# Patient Record
Sex: MALE | Race: BLACK OR AFRICAN AMERICAN
[De-identification: names, ages, dates, MRNs, and addresses within clinical notes are randomized per-mention and may not be internally consistent; named-entity substitution may affect disease eponyms.]

---

## 2019-08-19 ENCOUNTER — HOSPITAL ENCOUNTER (EMERGENCY)
Dept: HOSPITAL 92 - ERS | Age: 69
Discharge: HOME | End: 2019-08-19
Payer: OTHER GOVERNMENT
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
## 2019-08-19 DIAGNOSIS — M54.2: ICD-10-CM

## 2019-08-19 DIAGNOSIS — R10.9: ICD-10-CM

## 2019-08-19 DIAGNOSIS — R07.81: Primary | ICD-10-CM

## 2019-08-19 DIAGNOSIS — N18.3: ICD-10-CM

## 2019-08-19 DIAGNOSIS — I12.9: ICD-10-CM

## 2019-08-19 DIAGNOSIS — R55: ICD-10-CM

## 2019-08-19 LAB
ALBUMIN SERPL BCG-MCNC: 4.2 G/DL (ref 3.4–4.8)
ALP SERPL-CCNC: 63 U/L (ref 40–150)
ALT SERPL W P-5'-P-CCNC: 36 U/L (ref 8–55)
ANION GAP SERPL CALC-SCNC: 16 MMOL/L (ref 10–20)
AST SERPL-CCNC: 40 U/L (ref 5–34)
BASOPHILS # BLD AUTO: 0 THOU/UL (ref 0–0.2)
BASOPHILS NFR BLD AUTO: 0.4 % (ref 0–1)
BILIRUB SERPL-MCNC: 0.2 MG/DL (ref 0.2–1.2)
BUN SERPL-MCNC: 19 MG/DL (ref 8.4–25.7)
CALCIUM SERPL-MCNC: 9.4 MG/DL (ref 7.8–10.44)
CHLORIDE SERPL-SCNC: 103 MMOL/L (ref 98–107)
CO2 SERPL-SCNC: 19 MMOL/L (ref 23–31)
CREAT CL PREDICTED SERPL C-G-VRATE: 0 ML/MIN (ref 70–130)
DRUG SCREEN CUTOFF: (no result)
EOSINOPHIL # BLD AUTO: 0.4 THOU/UL (ref 0–0.7)
EOSINOPHIL NFR BLD AUTO: 6.5 % (ref 0–10)
GLOBULIN SER CALC-MCNC: 3 G/DL (ref 2.4–3.5)
GLUCOSE SERPL-MCNC: 99 MG/DL (ref 80–115)
HGB BLD-MCNC: 13.1 G/DL (ref 14–18)
LYMPHOCYTES # BLD: 2.1 THOU/UL (ref 1.2–3.4)
LYMPHOCYTES NFR BLD AUTO: 36.7 % (ref 21–51)
MCH RBC QN AUTO: 29.9 PG (ref 27–31)
MCV RBC AUTO: 88.6 FL (ref 78–98)
MEDTOX CONTROL LINE VALID?: (no result)
MEDTOX READER #: (no result)
MONOCYTES # BLD AUTO: 0.6 THOU/UL (ref 0.11–0.59)
MONOCYTES NFR BLD AUTO: 10.8 % (ref 0–10)
NEUTROPHILS # BLD AUTO: 2.7 THOU/UL (ref 1.4–6.5)
NEUTROPHILS NFR BLD AUTO: 45.6 % (ref 42–75)
PLATELET # BLD AUTO: 172 THOU/UL (ref 130–400)
POTASSIUM SERPL-SCNC: 4.3 MMOL/L (ref 3.5–5.1)
RBC # BLD AUTO: 4.39 MILL/UL (ref 4.7–6.1)
RBC UR QL AUTO: (no result) HPF (ref 0–3)
SODIUM SERPL-SCNC: 134 MMOL/L (ref 136–145)
WBC # BLD AUTO: 5.8 THOU/UL (ref 4.8–10.8)
WBC UR QL AUTO: (no result) HPF (ref 0–3)

## 2019-08-19 PROCEDURE — 80053 COMPREHEN METABOLIC PANEL: CPT

## 2019-08-19 PROCEDURE — 70450 CT HEAD/BRAIN W/O DYE: CPT

## 2019-08-19 PROCEDURE — 93005 ELECTROCARDIOGRAM TRACING: CPT

## 2019-08-19 PROCEDURE — 84484 ASSAY OF TROPONIN QUANT: CPT

## 2019-08-19 PROCEDURE — 84146 ASSAY OF PROLACTIN: CPT

## 2019-08-19 PROCEDURE — 36415 COLL VENOUS BLD VENIPUNCTURE: CPT

## 2019-08-19 PROCEDURE — 83690 ASSAY OF LIPASE: CPT

## 2019-08-19 PROCEDURE — 85025 COMPLETE CBC W/AUTO DIFF WBC: CPT

## 2019-08-19 PROCEDURE — 83605 ASSAY OF LACTIC ACID: CPT

## 2019-08-19 PROCEDURE — 71045 X-RAY EXAM CHEST 1 VIEW: CPT

## 2019-08-19 PROCEDURE — 80306 DRUG TEST PRSMV INSTRMNT: CPT

## 2019-08-19 PROCEDURE — 74177 CT ABD & PELVIS W/CONTRAST: CPT

## 2019-08-19 PROCEDURE — 81015 MICROSCOPIC EXAM OF URINE: CPT

## 2019-08-19 PROCEDURE — 72125 CT NECK SPINE W/O DYE: CPT

## 2019-08-19 PROCEDURE — 81003 URINALYSIS AUTO W/O SCOPE: CPT

## 2019-08-19 PROCEDURE — 71260 CT THORAX DX C+: CPT
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
## 2020-05-18 ENCOUNTER — HOSPITAL ENCOUNTER (EMERGENCY)
Dept: HOSPITAL 92 - ERS | Age: 70
Discharge: HOME | End: 2020-05-18
Payer: OTHER GOVERNMENT

## 2020-05-18 DIAGNOSIS — I10: ICD-10-CM

## 2020-05-18 DIAGNOSIS — E86.0: ICD-10-CM

## 2020-05-18 DIAGNOSIS — T67.5XXA: Primary | ICD-10-CM
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
## 2020-05-18 LAB
ALBUMIN SERPL BCG-MCNC: 3.7 G/DL (ref 3.4–4.8)
ALP SERPL-CCNC: 64 U/L (ref 40–110)
ALT SERPL W P-5'-P-CCNC: 24 U/L (ref 8–55)
ANION GAP SERPL CALC-SCNC: 14 MMOL/L (ref 10–20)
AST SERPL-CCNC: 29 U/L (ref 5–34)
BASOPHILS # BLD AUTO: 0 THOU/UL (ref 0–0.2)
BASOPHILS NFR BLD AUTO: 0.4 % (ref 0–1)
BILIRUB SERPL-MCNC: 0.3 MG/DL (ref 0.2–1.2)
BUN SERPL-MCNC: 22 MG/DL (ref 8.4–25.7)
CALCIUM SERPL-MCNC: 8.6 MG/DL (ref 7.8–10.44)
CHLORIDE SERPL-SCNC: 109 MMOL/L (ref 98–107)
CK SERPL-CCNC: 779 U/L (ref 30–200)
CO2 SERPL-SCNC: 18 MMOL/L (ref 23–31)
CREAT CL PREDICTED SERPL C-G-VRATE: 0 ML/MIN (ref 70–130)
DRUG SCREEN CUTOFF: (no result)
EOSINOPHIL # BLD AUTO: 0.3 THOU/UL (ref 0–0.7)
EOSINOPHIL NFR BLD AUTO: 3.7 % (ref 0–10)
GLOBULIN SER CALC-MCNC: 3.2 G/DL (ref 2.4–3.5)
GLUCOSE SERPL-MCNC: 114 MG/DL (ref 80–115)
HGB BLD-MCNC: 12.3 G/DL (ref 14–18)
LYMPHOCYTES # BLD: 1.4 THOU/UL (ref 1.2–3.4)
LYMPHOCYTES NFR BLD AUTO: 21.2 % (ref 21–51)
MCH RBC QN AUTO: 29.6 PG (ref 27–31)
MCV RBC AUTO: 90.5 FL (ref 78–98)
MEDTOX CONTROL LINE VALID?: (no result)
MEDTOX READER #: (no result)
MONOCYTES # BLD AUTO: 0.8 THOU/UL (ref 0.11–0.59)
MONOCYTES NFR BLD AUTO: 11.2 % (ref 0–10)
NEUTROPHILS # BLD AUTO: 4.3 THOU/UL (ref 1.4–6.5)
NEUTROPHILS NFR BLD AUTO: 63.4 % (ref 42–75)
PLATELET # BLD AUTO: 170 THOU/UL (ref 130–400)
POTASSIUM SERPL-SCNC: 3.9 MMOL/L (ref 3.5–5.1)
PROT UR STRIP.AUTO-MCNC: 20 MG/DL
RBC # BLD AUTO: 4.14 MILL/UL (ref 4.7–6.1)
SODIUM SERPL-SCNC: 137 MMOL/L (ref 136–145)
WBC # BLD AUTO: 6.7 THOU/UL (ref 4.8–10.8)

## 2020-05-18 PROCEDURE — 93005 ELECTROCARDIOGRAM TRACING: CPT

## 2020-05-18 PROCEDURE — 85025 COMPLETE CBC W/AUTO DIFF WBC: CPT

## 2020-05-18 PROCEDURE — 96361 HYDRATE IV INFUSION ADD-ON: CPT

## 2020-05-18 PROCEDURE — 81003 URINALYSIS AUTO W/O SCOPE: CPT

## 2020-05-18 PROCEDURE — 80306 DRUG TEST PRSMV INSTRMNT: CPT

## 2020-05-18 PROCEDURE — 71045 X-RAY EXAM CHEST 1 VIEW: CPT

## 2020-05-18 PROCEDURE — 96360 HYDRATION IV INFUSION INIT: CPT

## 2020-05-18 PROCEDURE — 36415 COLL VENOUS BLD VENIPUNCTURE: CPT

## 2020-05-18 PROCEDURE — 84484 ASSAY OF TROPONIN QUANT: CPT

## 2020-05-18 PROCEDURE — 82550 ASSAY OF CK (CPK): CPT

## 2020-05-18 PROCEDURE — 80053 COMPREHEN METABOLIC PANEL: CPT

## 2020-05-18 PROCEDURE — 85379 FIBRIN DEGRADATION QUANT: CPT

## 2020-05-18 PROCEDURE — 83605 ASSAY OF LACTIC ACID: CPT

## 2020-05-18 NOTE — RAD
PORTABLE CHEST:

 

History:

Syncope. 

 

Comparison: 

8-19-19

 

FINDINGS: 

Lungs are clear. No infiltrate. Vascular markings are normal. Heart and mediastinum unremarkable. Oss
eous structures are unremarkable. 

 

IMPRESSION: 

Unremarkable chest.

 

POS: [facility]

## 2020-05-20 NOTE — EKG
Test Reason : EMERGENCY EXAM

Blood Pressure : ***/*** mmHG

Vent. Rate : 091 BPM     Atrial Rate : 091 BPM

   P-R Int : 152 ms          QRS Dur : 076 ms

    QT Int : 344 ms       P-R-T Axes : 054 018 037 degrees

   QTc Int : 423 ms

 

Normal sinus rhythm

Normal ECG

 

Confirmed by DAYO ALVARADO, KEZIA KIM ([id number]),  JACKSON GONSALES ([id number]) on 5/20/2020 3:23:55 PM

 

Referred By:             Confirmed By:KEZIA OSHEA MD